# Patient Record
Sex: MALE
[De-identification: names, ages, dates, MRNs, and addresses within clinical notes are randomized per-mention and may not be internally consistent; named-entity substitution may affect disease eponyms.]

---

## 2020-09-01 ENCOUNTER — TRANSCRIPTION ENCOUNTER (OUTPATIENT)
Age: 55
End: 2020-09-01

## 2024-06-07 PROBLEM — Z00.00 ENCOUNTER FOR PREVENTIVE HEALTH EXAMINATION: Status: ACTIVE | Noted: 2024-06-07

## 2024-06-10 ENCOUNTER — APPOINTMENT (OUTPATIENT)
Dept: ORTHOPEDIC SURGERY | Facility: CLINIC | Age: 59
End: 2024-06-10
Payer: COMMERCIAL

## 2024-06-10 VITALS
SYSTOLIC BLOOD PRESSURE: 120 MMHG | TEMPERATURE: 97.6 F | WEIGHT: 218 LBS | HEIGHT: 74 IN | DIASTOLIC BLOOD PRESSURE: 75 MMHG | HEART RATE: 57 BPM | BODY MASS INDEX: 27.98 KG/M2 | OXYGEN SATURATION: 97 %

## 2024-06-10 DIAGNOSIS — R29.898 OTHER SYMPTOMS AND SIGNS INVOLVING THE MUSCULOSKELETAL SYSTEM: ICD-10-CM

## 2024-06-10 DIAGNOSIS — F17.200 NICOTINE DEPENDENCE, UNSPECIFIED, UNCOMPLICATED: ICD-10-CM

## 2024-06-10 DIAGNOSIS — Z98.890 OTHER SPECIFIED POSTPROCEDURAL STATES: ICD-10-CM

## 2024-06-10 DIAGNOSIS — M54.2 CERVICALGIA: ICD-10-CM

## 2024-06-10 PROCEDURE — 72050 X-RAY EXAM NECK SPINE 4/5VWS: CPT

## 2024-06-10 PROCEDURE — 99205 OFFICE O/P NEW HI 60 MIN: CPT

## 2024-06-10 NOTE — PHYSICAL EXAM
[UE/LE] : Sensory: Intact in bilateral upper & lower extremities [Normal DTR Reflexes] : DTR reflexes normal [Normal Proprioception] : sensation intact for proprioception [Normal] : No costovertebral angle tenderness and no spinal tenderness [de-identified] : Left first webspace atrophy.  4+ out of 5 pinch  and hand intrinsics left. [de-identified] : 4 view cervical spine x-ray 6/10/2024 Ortho PACS: Significant degenerative disc disease with posterior elements arthritis multiple levels.  Global alignment maintained

## 2024-06-10 NOTE — HISTORY OF PRESENT ILLNESS
[de-identified] : 58-year-old male presents as new patient for evaluation of chronic neck pain left hand weakness.  He has a history of cervical spine surgery performed in 2011 at [facility] for diagnosis of neck pain and left hand weakness.  States that this procedure helped him however he is left with chronic atrophy and weakness in the left hand and fingers.  Denies any radiating pain numbness or tingling presently.  Occasionally some left-sided upper trapezial pain extending into the base of the skull on the left side if he turns his head in certain positions

## 2024-06-10 NOTE — DISCUSSION/SUMMARY
[de-identified] : We discussed multiple potential etiologies of his ongoing symptoms.  We described the likely chronic neurologic deficit in the left hand was potentially unrecoverable given the time course and surgery.  I recommended therapy for the neck and hand to improve the range of motion and strength is much as possible.  Should this fail to improve his neck pain given the multilevel degenerative change we may consider an MRI of the cervical spine to assess for any focal facet arthritis that corresponds with his pain.  He will keep us informed of his progress  I have spent greater than 60 minutes preparing to see the patient, collecting relevant history, performing a thorough history and physical examination, counseling the patient regarding my findings ordering the appropriate therapies and tests, communicating with other relevant healthcare professionals, documenting my encounter and coordinating care.

## 2024-06-10 NOTE — ASSESSMENT
[FreeTextEntry1] : Is a 58-year-old male with history of cervical spine decompression chronic left hand intrinsic wasting and weakness mechanical neck pain